# Patient Record
Sex: MALE | Race: WHITE | NOT HISPANIC OR LATINO | Employment: FULL TIME | URBAN - METROPOLITAN AREA
[De-identification: names, ages, dates, MRNs, and addresses within clinical notes are randomized per-mention and may not be internally consistent; named-entity substitution may affect disease eponyms.]

---

## 2017-02-08 ENCOUNTER — TRANSFERRED RECORDS (OUTPATIENT)
Dept: HEALTH INFORMATION MANAGEMENT | Facility: CLINIC | Age: 77
End: 2017-02-08

## 2017-06-21 ENCOUNTER — OFFICE VISIT (OUTPATIENT)
Dept: OPHTHALMOLOGY | Facility: CLINIC | Age: 77
End: 2017-06-21

## 2017-06-21 DIAGNOSIS — H25.10 SENILE NUCLEAR SCLEROSIS, UNSPECIFIED LATERALITY: Primary | ICD-10-CM

## 2017-06-21 DIAGNOSIS — H52.13 MYOPIA, BILATERAL: ICD-10-CM

## 2017-06-21 ASSESSMENT — TONOMETRY
OD_IOP_MMHG: 11
OS_IOP_MMHG: 12
IOP_METHOD: ICARE

## 2017-06-21 ASSESSMENT — CONF VISUAL FIELD
OS_NORMAL: 1
OD_NORMAL: 1
METHOD: COUNTING FINGERS

## 2017-06-21 ASSESSMENT — REFRACTION_MANIFEST
OS_SPHERE: -1.75
OD_SPHERE: -1.25
OS_AXIS: 016
OD_AXIS: 002
OD_CYLINDER: +1.25
OS_CYLINDER: +1.25

## 2017-06-21 ASSESSMENT — CUP TO DISC RATIO
OS_RATIO: 0.3
OD_RATIO: 0.3

## 2017-06-21 ASSESSMENT — REFRACTION_WEARINGRX
SPECS_TYPE: SVL
OS_SPHERE: +1.50
OS_CYLINDER: SPHERE
OD_CYLINDER: SPHERE
OD_SPHERE: +1.50

## 2017-06-21 ASSESSMENT — VISUAL ACUITY
OD_SC+: -1
OS_SC: 20/30
OD_SC: 20/25
OS_SC+: -2
METHOD: SNELLEN - LINEAR

## 2017-06-21 ASSESSMENT — SLIT LAMP EXAM - LIDS
COMMENTS: NORMAL
COMMENTS: NORMAL

## 2017-06-21 ASSESSMENT — EXTERNAL EXAM - RIGHT EYE: OD_EXAM: NORMAL

## 2017-06-21 ASSESSMENT — EXTERNAL EXAM - LEFT EYE: OS_EXAM: NORMAL

## 2017-06-21 NOTE — NURSING NOTE
Chief Complaints and History of Present Illnesses   Patient presents with     COMPREHENSIVE EYE EXAM     HPI    Affected eye(s):  Both   Symptoms:     No difficulty with reading   Difficulty with driving (Comment: at night)      Duration:  9 months   Frequency:  Intermittent       Do you have eye pain now?:  No      Comments:  Notes significant difficulty driving after dark.  Feels NVA has improved.  Two retinal tears of RE fall of 2016; repaired by laser from [facility] COT 2:24 PM 06/21/2017

## 2017-06-21 NOTE — MR AVS SNAPSHOT
After Visit Summary   2017    Brain Urbina    MRN: [id number]           Patient Information     Date Of Birth          1940        Visit Information        Provider Department      2017 2:00 PM Arnol Stern MD [facility]        Today's Diagnoses     Senile nuclear sclerosis, unspecified laterality - Both Eyes    -  1    Myopia, bilateral           Follow-ups after your visit        Follow-up notes from your care team     Return in about 18 months (around 2018) for Complete Eye Exam.      Who to contact     Please call your clinic at [phone] to:    Ask questions about your health    Make or cancel appointments    Discuss your medicines    Learn about your test results    Speak to your doctor   If you have compliments or concerns about an experience at your clinic, or if you wish to file a complaint, please contact [facility] Patient Relations at [phone] or email us at [email or web address]         Additional Information About Your Visit        MyChart Information     [employer] is an electronic gateway that provides easy, online access to your medical records. With [employer], you can request a clinic appointment, read your test results, renew a prescription or communicate with your care team.     To sign up for [employer] visit the website at [email or web address]   You will be asked to enter the access code listed below, as well as some personal information. Please follow the directions to create your username and password.     Your access code is: [email or web address]  Expires: 2017  6:31 AM     Your access code will  in 90 days. If you need help or a new code, please contact your [facility] or call [phone] for assistance.        Care EveryWhere ID     This is your Care EveryWhere ID. This could be used by other organizations to access your [facility]  medical records  [id number]         Blood Pressure from Last 3 Encounters:   11/13/15 121/65   12/06/13 129/70   11/08/12 121/58    Weight from Last 3 Encounters:   11/13/15 104.6 kg (230 lb 8 oz)   12/06/13 101.6 kg (224 lb)   11/08/12 103.4 kg (227 lb 14.4 oz)              Today, you had the following     No orders found for display       Primary Care Provider Office Phone # Fax #    Orlando Clarke [phone]       [facility] [address]   [facility] [address]        Equal Access to Services     [facility] GOSIA : Hadii cristina Do, wajasminada ladan, zeus kaalmada pau, linda harrison . So [facility] [phone].    ATENCIÓN: Si habla español, tiene a carr disposición servicios gratuitos de asistencia lingüística. Llame al [phone].    We comply with applicable federal civil rights laws and [place] laws. We do not discriminate on the basis of race, color, national origin, age, disability sex, sexual orientation or gender identity.            Thank you!     Thank you for choosing [facility]  for your care. Our goal is always to provide you with excellent care. Hearing back from our patients is one way we can continue to improve our services. Please take a few minutes to complete the written survey that you may receive in the mail after your visit with us. Thank you!             Your Updated Medication List - Protect others around you: Learn how to safely use, store and throw away your medicines at www.disposemymeds.org.          This list is accurate as of: 6/21/17 11:59 PM.  Always use your most recent med list.                   Brand Name Dispense Instructions for use Diagnosis    aspirin 325 MG tablet      Take  by mouth daily.    Malignant neoplasm of prostate (H)       CO Q 10 PO      Take  by mouth.        FLOMAX 0.4 MG capsule   Generic drug:  tamsulosin      Take 0.4 mg by mouth daily.        FOLIC  ACID           OMEPRAZOLE PO           PYCNOGENOL PO      Take  by mouth.        tadalafil 20 MG tablet    CIALIS    15 tablet    Take 1 tablet (20 mg) by mouth daily as needed for erectile dysfunction    Other post-surgical erectile dysfunction       VITAMIN D PO      Take  by mouth.    Malignant neoplasm of prostate (H)       VITAMIN E      2,000 mg.

## 2017-07-01 NOTE — PROGRESS NOTES
Assessment & Plan      Brain Urbina is a 77 year old male with the following diagnoses:   (H25.10) Senile nuclear sclerosis, unspecified laterality - Both Eyes  (primary encounter diagnosis)  Comment: Mild  Plan: Follow    (H52.13) Myopia, bilateral  Comment: Significant  Plan: Rx for distance glasses     -----------------------------------------------------------------------------------      Patient disposition:   Return in about 18 months (around 12/21/2018) for Complete Eye Exam. or sooner as needed.    Complete documentation of historical and exam elements from today's encounter can  be found in the full encounter summary report (not reduplicated in this progress  note). I personally obtained the chief complaint(s) and history of present illness. I  confirmed and edited as necessary the review of systems, past medical/surgical  history, family history, social history, and examination findings as documented by  others; and I examined the patient myself. I personally reviewed the relevant tests,  images, and reports as documented above. I formulated and edited as necessary the  assessment and plan and discussed the findings and management plan with the  patient and family.    DANAY Stern M.D

## 2019-06-12 ENCOUNTER — OFFICE VISIT (OUTPATIENT)
Dept: OPHTHALMOLOGY | Facility: CLINIC | Age: 79
End: 2019-06-12
Payer: MEDICARE

## 2019-06-12 DIAGNOSIS — H52.13 MYOPIA, BILATERAL: ICD-10-CM

## 2019-06-12 DIAGNOSIS — H25.13 NUCLEAR SCLEROTIC CATARACT OF BOTH EYES: Primary | ICD-10-CM

## 2019-06-12 DIAGNOSIS — Z98.890 HISTORY OF REPAIR OF RETINAL TEAR BY LASER PHOTOCOAGULATION: ICD-10-CM

## 2019-06-12 ASSESSMENT — VISUAL ACUITY
OS_CC+: -3
OS_CC: 20/40
OD_CC: 20/40-2+2
METHOD: SNELLEN - LINEAR
CORRECTION_TYPE: GLASSES

## 2019-06-12 ASSESSMENT — CUP TO DISC RATIO
OD_RATIO: 0.3
OS_RATIO: 0.3

## 2019-06-12 ASSESSMENT — REFRACTION_MANIFEST
OD_AXIS: 180
OD_ADD: +2.50
OD_SPHERE: -2.00
OS_SPHERE: -2.75
OD_CYLINDER: +1.25
OS_CYLINDER: +1.25
OS_ADD: +2.50
OS_AXIS: 030

## 2019-06-12 ASSESSMENT — EXTERNAL EXAM - RIGHT EYE: OD_EXAM: NORMAL

## 2019-06-12 ASSESSMENT — TONOMETRY
OS_IOP_MMHG: 14
IOP_METHOD: APPLANATION
OD_IOP_MMHG: 13

## 2019-06-12 ASSESSMENT — REFRACTION_WEARINGRX
OD_CYLINDER: +1.25
OS_CYLINDER: +1.25
OD_AXIS: 002
OS_AXIS: 016
OD_SPHERE: -1.25
OS_SPHERE: -1.75
SPECS_TYPE: DISTANCE

## 2019-06-12 ASSESSMENT — EXTERNAL EXAM - LEFT EYE: OS_EXAM: NORMAL

## 2019-06-12 ASSESSMENT — SLIT LAMP EXAM - LIDS
COMMENTS: NORMAL
COMMENTS: NORMAL

## 2019-06-12 ASSESSMENT — CONF VISUAL FIELD: OS_NORMAL: 1

## 2019-06-12 NOTE — PROGRESS NOTES
"HPI  Brain Urbina is a 79 year old male here for comprehensive eye exam.  Complains of blurry vision for distance and better vision without correction for near in the last year. No new flashes/floaters.  Uses computer a lot.  Occasional dry eye.     PMH:  Prostate cancer prior Flomax (off now), aspirin   Ciprofoxacin allergy- rash  POH:  Glasses for myopia, cataracts both eyes, laser retinopexy both eyes (had \"blood right eye\"? Hemorrhagic posterior vitreous detachment 2000 VRS) and retinal tear status-post pexy left eye related to trauma.  Hit in LEFT eye with fishing pole left eye.    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 Left eye   Nuclear sclerotic cataract becoming significant both eyes -myopic shift  flomax history , dilates well  Plan:  Manifest refraction improves visual acuity  If still unhappy with vision after trying glasses, call sooner    (H52.13) Myopia, bilateral - Both Eyes  Comment: shift due to cataracts  Plan:  Manifest refraction done and prescription for glasses given     (Z98.890) History of repair of retinal tear by laser photocoagulation - Both Eyes  Comment: stable   Plan: discussed with patient about signs and symptoms of retinal tear/rd and to call if they occur      -----------------------------------------------------------------------------------    Patient disposition:   Return in about 1 year (around 6/12/2020) for Comprehensive Exam- cataracts. Call for sooner appointment as needed.    Complete documentation of historical and exam elements from today's encounter can be found in the full encounter summary report (not reduplicated in this progress note). I personally obtained the chief complaint(s) and history of present illness.  I have confirmed and edited as " necessary the CC, HPI, PMH/PSH, social history, FMH, ROS, and exam/neuro findings as obtained by the technician or others. I have examined this patient myself and I personally viewed the image(s) and studies listed above and the documentation reflects my findings and interpretation.  I formulated and edited as necessary the assessment and plan and discussed the findings and management plan with the patient and family.     Rosita Carpenter MD

## 2023-05-11 ENCOUNTER — TRANSCRIBE ORDERS (OUTPATIENT)
Dept: OTHER | Age: 83
End: 2023-05-11

## 2023-05-11 DIAGNOSIS — Z96.611 AFTERCARE FOLLOWING RIGHT SHOULDER JOINT REPLACEMENT SURGERY: Primary | ICD-10-CM

## 2023-05-11 DIAGNOSIS — Z47.1 AFTERCARE FOLLOWING RIGHT SHOULDER JOINT REPLACEMENT SURGERY: Primary | ICD-10-CM

## 2023-05-24 ENCOUNTER — MEDICAL CORRESPONDENCE (OUTPATIENT)
Dept: HEALTH INFORMATION MANAGEMENT | Facility: CLINIC | Age: 83
End: 2023-05-24

## 2023-05-24 ENCOUNTER — THERAPY VISIT (OUTPATIENT)
Dept: PHYSICAL THERAPY | Facility: CLINIC | Age: 83
End: 2023-05-24
Payer: COMMERCIAL

## 2023-05-24 DIAGNOSIS — M25.511 CHRONIC RIGHT SHOULDER PAIN: ICD-10-CM

## 2023-05-24 DIAGNOSIS — G89.29 CHRONIC RIGHT SHOULDER PAIN: ICD-10-CM

## 2023-05-24 PROCEDURE — 97110 THERAPEUTIC EXERCISES: CPT | Mod: GP | Performed by: PHYSICAL THERAPIST

## 2023-05-24 PROCEDURE — 97161 PT EVAL LOW COMPLEX 20 MIN: CPT | Mod: GP | Performed by: PHYSICAL THERAPIST

## 2023-05-24 PROCEDURE — 97112 NEUROMUSCULAR REEDUCATION: CPT | Mod: GP | Performed by: PHYSICAL THERAPIST

## 2023-05-24 NOTE — PROGRESS NOTES
PHYSICAL THERAPY EVALUATION  Type of Visit: Evaluation    See electronic medical record for Abuse and Falls Screening details.    Subjective      Presenting condition or subjective complaint:  pt continues to have some shoulder discomfort and stiffness and wants to be able to lift weights but when he did too much his shoulder would get sore and his MD said he was lifting too much   Date of onset:      Relevant medical history:     Dates & types of surgery:      Prior diagnostic imaging/testing results:       Prior therapy history for the same diagnosis, illness or injury:        Prior Level of Function   Transfers: Independent  Ambulation: Independent  ADL: Independent  IADL: Yard work    Living Environment  Social support:     Type of home:     Stairs to enter the home:         Ramp:     Stairs inside the home:         Help at home:    Equipment owned:       Employment:      Hobbies/Interests:      Patient goals for therapy:      Pain assessment: Pain present     Objective   Pain:   Location: anterior, lateral, UT  At rest: 0/10  With activity: 0/10  Quality: dull ache if he's sleeping  Frequency: Intermittent  Time of Day: worse in pm  Pain is exacerbated by: lying on shoulder   Pain is relieved by: change of position    Range of Motion:      AROM L shoulder: 150/155/T9/65     AROM R shoulder: 120/120/L5/35  Strength:  B shoulder: 4/4/4/4      Notable mechanics: R scapular medial border prominence, winging and upward translation with flexion>abduction     Pt required signficant manual, verbal and visual cuing to maintain proper scapular positioning during exercises today. NMR required for serratus, MT, and LT.      Palpation: Tension and hypertonicity noted at R UT, LS, rhomboids  Posture: Fwd head, rounded shoulders    Assessment & Plan   CLINICAL IMPRESSIONS   Medical Diagnosis:      Treatment Diagnosis:     Impression/Assessment: Patient is a 83 year old male with R shoulder complaints.  The following  significant findings have been identified: Decreased ROM/flexibility, Decreased joint mobility, Decreased strength, Decreased proprioception and Impaired muscle performance. These impairments interfere with their ability to perform self care tasks, work tasks, recreational activities and household chores as compared to previous level of function.     Clinical Decision Making (Complexity):   Clinical Presentation: Stable/Uncomplicated  Clinical Presentation Rationale: based on medical and personal factors listed in PT evaluation  Clinical Decision Making (Complexity): Low complexity    PLAN OF CARE  Treatment Interventions:  Interventions: Neuromuscular Re-education, Therapeutic Activity, Therapeutic Exercise    Long Term Goals            Frequency of Treatment:    Duration of Treatment:      Recommended Referrals to Other Professionals: Physical Therapy  Education Assessment:        Risks and benefits of evaluation/treatment have been explained.   Patient/Family/caregiver agrees with Plan of Care.     Evaluation Time:            Signing Clinician: Amparo Pederson PT      [facility]                                                                                   OUTPATIENT PHYSICAL THERAPY      PLAN OF TREATMENT FOR OUTPATIENT REHABILITATION   Patient's Last Name, First Name, Brain Borjas YOB: 1940   Provider's Name   [facility]   Medical Record No.  [id number]     Onset Date:    Start of Care Date:       Medical Diagnosis:         PT Treatment Diagnosis:    Plan of Treatment  Frequency/Duration:  /      Certification date from   to           See note for plan of treatment details and functional goals     Amparo Pederson PT                         I CERTIFY THE NEED FOR THESE SERVICES FURNISHED UNDER        THIS PLAN OF TREATMENT AND WHILE UNDER MY CARE .             Physician Signature                Date    X_____________________________________________________                        Referring Provider:  Xavi Baires      Initial Assessment  See Epic Evaluation-

## 2023-06-26 ENCOUNTER — THERAPY VISIT (OUTPATIENT)
Dept: PHYSICAL THERAPY | Facility: CLINIC | Age: 83
End: 2023-06-26
Payer: COMMERCIAL

## 2023-06-26 DIAGNOSIS — Z47.1 AFTERCARE FOLLOWING RIGHT SHOULDER JOINT REPLACEMENT SURGERY: ICD-10-CM

## 2023-06-26 DIAGNOSIS — M25.511 CHRONIC RIGHT SHOULDER PAIN: Primary | ICD-10-CM

## 2023-06-26 DIAGNOSIS — G89.29 CHRONIC RIGHT SHOULDER PAIN: Primary | ICD-10-CM

## 2023-06-26 DIAGNOSIS — Z96.611 AFTERCARE FOLLOWING RIGHT SHOULDER JOINT REPLACEMENT SURGERY: ICD-10-CM

## 2023-06-26 PROCEDURE — 97112 NEUROMUSCULAR REEDUCATION: CPT | Mod: GP | Performed by: PHYSICAL THERAPIST

## 2023-06-26 PROCEDURE — 97110 THERAPEUTIC EXERCISES: CPT | Mod: GP | Performed by: PHYSICAL THERAPIST

## 2023-07-05 ENCOUNTER — THERAPY VISIT (OUTPATIENT)
Dept: PHYSICAL THERAPY | Facility: CLINIC | Age: 83
End: 2023-07-05
Payer: COMMERCIAL

## 2023-07-05 DIAGNOSIS — G89.29 CHRONIC RIGHT SHOULDER PAIN: Primary | ICD-10-CM

## 2023-07-05 DIAGNOSIS — M25.511 CHRONIC RIGHT SHOULDER PAIN: Primary | ICD-10-CM

## 2023-07-05 PROCEDURE — 97112 NEUROMUSCULAR REEDUCATION: CPT | Mod: GP | Performed by: PHYSICAL THERAPIST

## 2023-07-05 PROCEDURE — 97110 THERAPEUTIC EXERCISES: CPT | Mod: GP | Performed by: PHYSICAL THERAPIST

## 2023-07-19 ENCOUNTER — THERAPY VISIT (OUTPATIENT)
Dept: PHYSICAL THERAPY | Facility: CLINIC | Age: 83
End: 2023-07-19
Payer: COMMERCIAL

## 2023-07-19 DIAGNOSIS — M25.511 CHRONIC RIGHT SHOULDER PAIN: Primary | ICD-10-CM

## 2023-07-19 DIAGNOSIS — G89.29 CHRONIC RIGHT SHOULDER PAIN: Primary | ICD-10-CM

## 2023-07-19 PROCEDURE — 97110 THERAPEUTIC EXERCISES: CPT | Mod: GP | Performed by: PHYSICAL THERAPIST

## 2023-07-19 PROCEDURE — 97112 NEUROMUSCULAR REEDUCATION: CPT | Mod: GP | Performed by: PHYSICAL THERAPIST

## 2023-07-19 NOTE — PROGRESS NOTES
DISCHARGE  Reason for Discharge: Patient has met all goals.    Equipment Issued: n/a    Discharge Plan: Patient to continue home program.    Referring Provider:  Xaiv Baires